# Patient Record
Sex: MALE | Race: WHITE | NOT HISPANIC OR LATINO | Employment: FULL TIME | URBAN - METROPOLITAN AREA
[De-identification: names, ages, dates, MRNs, and addresses within clinical notes are randomized per-mention and may not be internally consistent; named-entity substitution may affect disease eponyms.]

---

## 2023-09-14 PROBLEM — I10 ESSENTIAL HYPERTENSION: Status: ACTIVE | Noted: 2023-09-14

## 2023-09-14 PROBLEM — F17.200 CURRENT SMOKER: Status: ACTIVE | Noted: 2023-09-14

## 2023-09-14 PROBLEM — R55 SYNCOPE AND COLLAPSE: Status: ACTIVE | Noted: 2023-09-14

## 2024-01-01 PROBLEM — E78.2 MIXED HYPERLIPIDEMIA: Status: ACTIVE | Noted: 2024-01-01

## 2024-02-22 ENCOUNTER — TELEPHONE (OUTPATIENT)
Dept: SMOKING CESSATION | Facility: CLINIC | Age: 44
End: 2024-02-22

## 2024-02-22 NOTE — TELEPHONE ENCOUNTER
Called patient to confirm clinic intake appt for smoking cessation on 2/26/24. Patient answered and confirmed.

## 2024-02-26 ENCOUNTER — CLINICAL SUPPORT (OUTPATIENT)
Dept: SMOKING CESSATION | Facility: CLINIC | Age: 44
End: 2024-02-26
Payer: COMMERCIAL

## 2024-02-26 DIAGNOSIS — F17.200 NICOTINE DEPENDENCE: Primary | ICD-10-CM

## 2024-02-26 NOTE — Clinical Note
Met with patient in clinic to conduct Quit Attempt 1 intake appointment. Patient will be participating in weekly tobacco cessation meetings for behavioral counseling sessions. Patient does not have medical insurance coverage. He has applied for Medicaid. He will get tobacco cessation medication 21 mg patches OTC when he has the money. FTND of 6 indicates a high level of dependence to tobacco. REY-D of 5 is perceived as no mental distress/ depression.

## 2024-03-11 ENCOUNTER — CLINICAL SUPPORT (OUTPATIENT)
Dept: SMOKING CESSATION | Facility: CLINIC | Age: 44
End: 2024-03-11
Payer: COMMERCIAL

## 2024-03-11 DIAGNOSIS — F17.200 NICOTINE DEPENDENCE: Primary | ICD-10-CM

## 2024-03-11 NOTE — PROGRESS NOTES
Individual Follow-Up Form    3/11/2024    Quit Date: TBD    Clinical Status of Patient: Outpatient    Length of Service: 30 minutes    Continuing Medication: no    Other Medications: none     Target Symptoms: Withdrawal and medication side effects. The following were  rated moderate (3) to severe (4) on TCRS:  Moderate (3): none  Severe (4): none    Comments: Spoke with patient at length via phone regarding tobacco cessation progress. Patient does not have any type of health insurance. He just recently applied for the second time two days after our last visit on 2/26/24. He is not sure how long the process will take. I advised him to get 21mg nicotine patches OTC if he could afford them. He has agreed to have behavioral counseling session to work on goals to help him quit. Patient says he wants to quit smoking because its hard to breath, he has hypertension and a prediabetic. Patient does not drink coffee but he recently was a  and medical issues forced him to stop. He currently smokes 20 cigs a day. His wife also smokes and they smoke inside. Goal this week is to smoke outside only and smoke only 18 cigs a day for next 2 weeks. He says he has been going to the door to smoke. Discussed learned addiction model, cues/triggers, personal reasons for quitting, medications, and goals. The patient will continue with  therapy sessions with [facility]. The patient denies any abnormal behavioral or mental changes at this time.           Diagnosis: F17.200    Next Visit: 2 weeks

## 2024-03-11 NOTE — Clinical Note
Patient does not have any type of medical insurance. He just recently applied for the second time two days after our last visit on 2/26/24. He is not sure how long the process will take. I advised him to get 21mg nicotine patches OTC if he could afford them. He has agreed to have behavioral counseling session to work on goals to help him quit. Patient says he wants to quit smoking because its hard to breath, he has hypertension and a prediabetic. Patient does not drink coffee but he recently was a  and medical issues forced him to stop. He currently smokes 20 cigs a day. His wife also smokes and they smoke inside. Goal this week is to smoke outside only and smoke only 18 cigs a day for next 2 weeks. He says he has been going to the door to smoke. Discussed learned addiction model, cues/triggers, personal reasons for quitting, medications, and goals. The patient will continue with  therapy sessions with [facility]. The patient denies any abnormal behavioral or mental changes at this time.

## 2024-03-25 ENCOUNTER — TELEPHONE (OUTPATIENT)
Dept: SMOKING CESSATION | Facility: CLINIC | Age: 44
End: 2024-03-25

## 2024-05-16 ENCOUNTER — CLINICAL SUPPORT (OUTPATIENT)
Dept: SMOKING CESSATION | Facility: CLINIC | Age: 44
End: 2024-05-16

## 2024-05-16 DIAGNOSIS — F17.200 NICOTINE DEPENDENCE: Primary | ICD-10-CM

## 2024-05-16 PROCEDURE — 99999 PR PBB SHADOW E&M-EST. PATIENT-LVL I: CPT | Mod: PBBFAC,,,

## 2024-05-16 NOTE — PROGRESS NOTES
Called pt to f/u on his 3 month smoking cessation quit status. Pt stated he is still smoking. Informed him he has benefits available and is able to rejoin. Informed him of benefit period, phone follow ups, and contact information. Will complete smart form and will continue to follow up on quit #1 episode.

## 2024-06-26 PROBLEM — R55 SYNCOPE AND COLLAPSE: Status: RESOLVED | Noted: 2023-09-14 | Resolved: 2024-06-26

## 2025-02-07 ENCOUNTER — CLINICAL SUPPORT (OUTPATIENT)
Dept: SMOKING CESSATION | Facility: CLINIC | Age: 45
End: 2025-02-07

## 2025-02-07 DIAGNOSIS — F17.200 NICOTINE DEPENDENCE: Primary | ICD-10-CM

## 2025-02-07 PROCEDURE — 99407 BEHAV CHNG SMOKING > 10 MIN: CPT | Mod: ,,, | Performed by: GENERAL PRACTICE

## 2025-02-07 PROCEDURE — 99999 PR PBB SHADOW E&M-EST. PATIENT-LVL I: CPT | Mod: PBBFAC,,,

## 2025-02-07 NOTE — PROGRESS NOTES
Spoke with patient today in regard to smoking cessation progress 6/12 month telephone follow up, he states that he is not tobacco free  Patient states he trimmed down to 3/4 of a pack.  Commended patient on the accomplishments thus far.  Informed patient of benefit period, future follow up, and contact information if any further help or support is needed.  Patient not ready to schedule appointment at this time.  Will complete smart form for 6/12 month follow up on Quit attempt #1 and resolve episode.